# Patient Record
Sex: FEMALE | Race: WHITE | NOT HISPANIC OR LATINO | URBAN - METROPOLITAN AREA
[De-identification: names, ages, dates, MRNs, and addresses within clinical notes are randomized per-mention and may not be internally consistent; named-entity substitution may affect disease eponyms.]

---

## 2017-09-12 ENCOUNTER — OUTPATIENT (OUTPATIENT)
Dept: OUTPATIENT SERVICES | Facility: HOSPITAL | Age: 27
LOS: 1 days | Discharge: HOME | End: 2017-09-12

## 2017-09-12 DIAGNOSIS — R59.1 GENERALIZED ENLARGED LYMPH NODES: ICD-10-CM

## 2019-09-10 PROBLEM — Z00.00 ENCOUNTER FOR PREVENTIVE HEALTH EXAMINATION: Status: ACTIVE | Noted: 2019-09-10

## 2019-09-17 ENCOUNTER — LABORATORY RESULT (OUTPATIENT)
Age: 29
End: 2019-09-17

## 2019-09-18 ENCOUNTER — OUTPATIENT (OUTPATIENT)
Dept: OUTPATIENT SERVICES | Facility: HOSPITAL | Age: 29
LOS: 1 days | Discharge: HOME | End: 2019-09-18

## 2019-09-18 ENCOUNTER — APPOINTMENT (OUTPATIENT)
Dept: SURGERY | Facility: CLINIC | Age: 29
End: 2019-09-18
Payer: COMMERCIAL

## 2019-09-18 VITALS
BODY MASS INDEX: 23.03 KG/M2 | WEIGHT: 122 LBS | TEMPERATURE: 99.3 F | HEART RATE: 92 BPM | HEIGHT: 61 IN | DIASTOLIC BLOOD PRESSURE: 90 MMHG | SYSTOLIC BLOOD PRESSURE: 138 MMHG

## 2019-09-18 DIAGNOSIS — L05.91 PILONIDAL CYST WITHOUT ABSCESS: ICD-10-CM

## 2019-09-18 PROCEDURE — 99203 OFFICE O/P NEW LOW 30 MIN: CPT

## 2019-09-21 RX ORDER — AMOXICILLIN AND CLAVULANATE POTASSIUM 875; 125 MG/1; MG/1
875-125 TABLET, COATED ORAL
Qty: 14 | Refills: 0 | Status: ACTIVE | COMMUNITY
Start: 2019-09-21 | End: 1900-01-01

## 2019-09-21 NOTE — REASON FOR VISIT
[Initial Consultation] : an initial consultation for [FreeTextEntry2] : Pilonidal cyst versus inclusion cyst

## 2019-09-21 NOTE — PHYSICAL EXAM
[Normal] : supple, no neck mass and thyroid not enlarged [Normal Neck Lymph Nodes] : normal neck lymph nodes  [Normal Supraclavicular Lymph Nodes] : normal supraclavicular lymph nodes [Normal] : oriented to person, place and time, with appropriate affect [de-identified] : right, buttock, 4 x 3 cm area that appeared an infected inclusion cyst. Culture sent.

## 2019-09-21 NOTE — HISTORY OF PRESENT ILLNESS
[de-identified] : 28 yo female patient with no significant medical history, complains of an infected cyst in her right buttock. The cyst drained spontaneously. That happened in the past past and she wants a more definitive treatment for recurrent infection in her buttock. She denies  any other symptoms.

## 2019-09-21 NOTE — ASSESSMENT
[FreeTextEntry1] : 28 yo female patient with no significant medical history, complains of an infected cyst in her right buttock. The cyst drained spontaneously. That happened in the past past and she wants a more definitive treatment for recurrent infection in her buttock. She denies  any other symptoms. \par \par Assessment and Plan:\par \par Infected inclusion cyst, right buttock.\par Culture sent.\par Continue antibiotic previously prescribed.\par \par Excision of recurrent sebaceous cyst offered, right buttock. Risks, benefits, alternatives and long-term consequences of the procedure were fully discussed with the patient. Consent signed in my office today. Preoperative work-up ordered. \par \par All the questions were answered to their satisfaction and I encouraged the patient to call my office at anytime if they had any questions. Plan of care fully discussed with the patient.

## 2019-10-02 ENCOUNTER — APPOINTMENT (OUTPATIENT)
Dept: SURGERY | Facility: CLINIC | Age: 29
End: 2019-10-02
Payer: COMMERCIAL

## 2019-10-02 VITALS
HEIGHT: 61 IN | SYSTOLIC BLOOD PRESSURE: 122 MMHG | WEIGHT: 121 LBS | BODY MASS INDEX: 22.84 KG/M2 | DIASTOLIC BLOOD PRESSURE: 66 MMHG | TEMPERATURE: 99 F | HEART RATE: 84 BPM

## 2019-10-02 PROCEDURE — 99213 OFFICE O/P EST LOW 20 MIN: CPT

## 2019-10-06 NOTE — PHYSICAL EXAM
[Normal] : supple, no neck mass and thyroid not enlarged [Normal Neck Lymph Nodes] : normal neck lymph nodes  [Normal Supraclavicular Lymph Nodes] : normal supraclavicular lymph nodes [Normal] : oriented to person, place and time, with appropriate affect [de-identified] : right, buttock, 4 x 3 cm area that appeared an inclusion cyst. No cellulitis and wound closed.

## 2019-10-06 NOTE — HISTORY OF PRESENT ILLNESS
[de-identified] : 28 yo female patient with no significant medical history, complains of an infected cyst in her right buttock. The cyst drained spontaneously. That happened in the past past and she wants a more definitive treatment for recurrent infection in her buttock. She was treated with Augmentin and wound closed. She denies  any other symptoms. Today for followup.

## 2019-10-06 NOTE — ASSESSMENT
[FreeTextEntry1] : 30 yo female patient with no significant medical history, complains of an infected cyst in her right buttock. The cyst drained spontaneously. That happened in the past past and she wants a more definitive treatment for recurrent infection in her buttock. She was treated with Augmentin and wound closed. She denies  any other symptoms. Today for followup. \par \par Assessment and Plan:\par \par Infected inclusion cyst, right buttock. Infection treated, wound closed.\par Culture revealed enterococcus, sensitive to Ampicillin, started on Augmentin.\par \par Excision of recurrent sebaceous cyst offered, right buttock. Risks, benefits, alternatives and long-term consequences of the procedure were fully discussed with the patient. Consent signed in my office today. Preoperative work-up ordered. \par \par All the questions were answered to their satisfaction and I encouraged the patient to call my office at anytime if she had any questions. Plan of care fully discussed with the patient.

## 2019-10-24 ENCOUNTER — OUTPATIENT (OUTPATIENT)
Dept: OUTPATIENT SERVICES | Facility: HOSPITAL | Age: 29
LOS: 1 days | Discharge: HOME | End: 2019-10-24

## 2019-10-24 VITALS
OXYGEN SATURATION: 96 % | DIASTOLIC BLOOD PRESSURE: 68 MMHG | TEMPERATURE: 98 F | HEIGHT: 61 IN | WEIGHT: 123.02 LBS | HEART RATE: 68 BPM | RESPIRATION RATE: 18 BRPM | SYSTOLIC BLOOD PRESSURE: 121 MMHG

## 2019-10-24 DIAGNOSIS — L05.91 PILONIDAL CYST WITHOUT ABSCESS: ICD-10-CM

## 2019-10-24 DIAGNOSIS — Z98.890 OTHER SPECIFIED POSTPROCEDURAL STATES: Chronic | ICD-10-CM

## 2019-10-24 DIAGNOSIS — Z01.818 ENCOUNTER FOR OTHER PREPROCEDURAL EXAMINATION: ICD-10-CM

## 2019-10-24 LAB
ALBUMIN SERPL ELPH-MCNC: 4.4 G/DL — SIGNIFICANT CHANGE UP (ref 3.5–5.2)
ALP SERPL-CCNC: 70 U/L — SIGNIFICANT CHANGE UP (ref 30–115)
ALT FLD-CCNC: 15 U/L — SIGNIFICANT CHANGE UP (ref 0–41)
ANION GAP SERPL CALC-SCNC: 15 MMOL/L — HIGH (ref 7–14)
AST SERPL-CCNC: 19 U/L — SIGNIFICANT CHANGE UP (ref 0–41)
BASOPHILS # BLD AUTO: 0.07 K/UL — SIGNIFICANT CHANGE UP (ref 0–0.2)
BASOPHILS NFR BLD AUTO: 1 % — SIGNIFICANT CHANGE UP (ref 0–1)
BILIRUB SERPL-MCNC: 0.3 MG/DL — SIGNIFICANT CHANGE UP (ref 0.2–1.2)
BUN SERPL-MCNC: 9 MG/DL — LOW (ref 10–20)
CALCIUM SERPL-MCNC: 9.4 MG/DL — SIGNIFICANT CHANGE UP (ref 8.5–10.1)
CHLORIDE SERPL-SCNC: 100 MMOL/L — SIGNIFICANT CHANGE UP (ref 98–110)
CO2 SERPL-SCNC: 24 MMOL/L — SIGNIFICANT CHANGE UP (ref 17–32)
CREAT SERPL-MCNC: 0.6 MG/DL — LOW (ref 0.7–1.5)
EOSINOPHIL # BLD AUTO: 0.31 K/UL — SIGNIFICANT CHANGE UP (ref 0–0.7)
EOSINOPHIL NFR BLD AUTO: 4.6 % — SIGNIFICANT CHANGE UP (ref 0–8)
GLUCOSE SERPL-MCNC: 81 MG/DL — SIGNIFICANT CHANGE UP (ref 70–99)
HCT VFR BLD CALC: 44.2 % — SIGNIFICANT CHANGE UP (ref 37–47)
HGB BLD-MCNC: 14.3 G/DL — SIGNIFICANT CHANGE UP (ref 12–16)
IMM GRANULOCYTES NFR BLD AUTO: 0.3 % — SIGNIFICANT CHANGE UP (ref 0.1–0.3)
LYMPHOCYTES # BLD AUTO: 1.48 K/UL — SIGNIFICANT CHANGE UP (ref 1.2–3.4)
LYMPHOCYTES # BLD AUTO: 21.9 % — SIGNIFICANT CHANGE UP (ref 20.5–51.1)
MCHC RBC-ENTMCNC: 29.4 PG — SIGNIFICANT CHANGE UP (ref 27–31)
MCHC RBC-ENTMCNC: 32.4 G/DL — SIGNIFICANT CHANGE UP (ref 32–37)
MCV RBC AUTO: 90.9 FL — SIGNIFICANT CHANGE UP (ref 81–99)
MONOCYTES # BLD AUTO: 0.53 K/UL — SIGNIFICANT CHANGE UP (ref 0.1–0.6)
MONOCYTES NFR BLD AUTO: 7.8 % — SIGNIFICANT CHANGE UP (ref 1.7–9.3)
NEUTROPHILS # BLD AUTO: 4.36 K/UL — SIGNIFICANT CHANGE UP (ref 1.4–6.5)
NEUTROPHILS NFR BLD AUTO: 64.4 % — SIGNIFICANT CHANGE UP (ref 42.2–75.2)
NRBC # BLD: 0 /100 WBCS — SIGNIFICANT CHANGE UP (ref 0–0)
PLATELET # BLD AUTO: 306 K/UL — SIGNIFICANT CHANGE UP (ref 130–400)
POTASSIUM SERPL-MCNC: 4.7 MMOL/L — SIGNIFICANT CHANGE UP (ref 3.5–5)
POTASSIUM SERPL-SCNC: 4.7 MMOL/L — SIGNIFICANT CHANGE UP (ref 3.5–5)
PROT SERPL-MCNC: 8.1 G/DL — HIGH (ref 6–8)
RBC # BLD: 4.86 M/UL — SIGNIFICANT CHANGE UP (ref 4.2–5.4)
RBC # FLD: 12.3 % — SIGNIFICANT CHANGE UP (ref 11.5–14.5)
SODIUM SERPL-SCNC: 139 MMOL/L — SIGNIFICANT CHANGE UP (ref 135–146)
WBC # BLD: 6.77 K/UL — SIGNIFICANT CHANGE UP (ref 4.8–10.8)
WBC # FLD AUTO: 6.77 K/UL — SIGNIFICANT CHANGE UP (ref 4.8–10.8)

## 2019-10-24 NOTE — H&P PST ADULT - REASON FOR ADMISSION
28 yo f scheduled to PAST for excision of pilonidal cyst under GA on 10/31/2019 at [facility] by Dr. Amaya.

## 2019-10-24 NOTE — H&P PST ADULT - NSANTHOBSERVEDRD_ENT_A_CORE
gait, locomotion, and balance/aerobic capacity/endurance/muscle strength gait, locomotion, and balance/muscle strength/aerobic capacity/endurance No

## 2019-10-24 NOTE — H&P PST ADULT - NSANTHOSAYNRD_GEN_A_CORE
No. MENDOZA screening performed.  STOP BANG Legend: 0-2 = LOW Risk; 3-4 = INTERMEDIATE Risk; 5-8 = HIGH Risk

## 2019-10-24 NOTE — H&P PST ADULT - HISTORY OF PRESENT ILLNESS
Patient c/o recurrent pilonidal cyst with infection x 1 yr. Patient denies any c/o cp, sob, palpitations fever, cough or dysuria. Ex tolerance of 3 fos walk with out SOB. No diagnosis of MENDOZA.

## 2019-10-24 NOTE — H&P PST ADULT - NSICDXPASTMEDICALHX_GEN_ALL_CORE_FT
PAST MEDICAL HISTORY:  Asthma     GERD (gastroesophageal reflux disease) PAST MEDICAL HISTORY:  Asthma 2001 as a child    GERD (gastroesophageal reflux disease)     History of allergic rhinitis shakira

## 2019-10-24 NOTE — H&P PST ADULT - NSICDXPASTSURGICALHX_GEN_ALL_CORE_FT
PAST SURGICAL HISTORY:  History of incision and drainage under PAST SURGICAL HISTORY:  History of incision and drainage under  local

## 2019-10-25 PROBLEM — K21.9 GASTRO-ESOPHAGEAL REFLUX DISEASE WITHOUT ESOPHAGITIS: Chronic | Status: ACTIVE | Noted: 2019-10-24

## 2019-10-25 PROBLEM — J45.909 UNSPECIFIED ASTHMA, UNCOMPLICATED: Chronic | Status: ACTIVE | Noted: 2019-10-24

## 2019-10-25 PROBLEM — Z87.09 PERSONAL HISTORY OF OTHER DISEASES OF THE RESPIRATORY SYSTEM: Chronic | Status: ACTIVE | Noted: 2019-10-24

## 2019-10-29 ENCOUNTER — CHART COPY (OUTPATIENT)
Age: 29
End: 2019-10-29

## 2019-10-31 ENCOUNTER — RESULT REVIEW (OUTPATIENT)
Age: 29
End: 2019-10-31

## 2019-10-31 ENCOUNTER — APPOINTMENT (OUTPATIENT)
Dept: SURGERY | Facility: HOSPITAL | Age: 29
End: 2019-10-31

## 2019-10-31 ENCOUNTER — OUTPATIENT (OUTPATIENT)
Dept: OUTPATIENT SERVICES | Facility: HOSPITAL | Age: 29
LOS: 1 days | Discharge: HOME | End: 2019-10-31
Payer: COMMERCIAL

## 2019-10-31 VITALS
RESPIRATION RATE: 16 BRPM | DIASTOLIC BLOOD PRESSURE: 78 MMHG | SYSTOLIC BLOOD PRESSURE: 120 MMHG | OXYGEN SATURATION: 98 % | HEART RATE: 69 BPM

## 2019-10-31 VITALS
WEIGHT: 123.02 LBS | HEART RATE: 97 BPM | HEIGHT: 61 IN | DIASTOLIC BLOOD PRESSURE: 102 MMHG | RESPIRATION RATE: 18 BRPM | SYSTOLIC BLOOD PRESSURE: 122 MMHG | TEMPERATURE: 99 F

## 2019-10-31 DIAGNOSIS — Z98.890 OTHER SPECIFIED POSTPROCEDURAL STATES: Chronic | ICD-10-CM

## 2019-10-31 PROCEDURE — 11770 EXC PILONIDAL CYST SIMPLE: CPT

## 2019-10-31 PROCEDURE — 88304 TISSUE EXAM BY PATHOLOGIST: CPT | Mod: 26

## 2019-10-31 RX ORDER — IBUPROFEN 200 MG
1 TABLET ORAL
Qty: 9 | Refills: 0
Start: 2019-10-31 | End: 2019-11-02

## 2019-10-31 RX ORDER — HYDROMORPHONE HYDROCHLORIDE 2 MG/ML
1 INJECTION INTRAMUSCULAR; INTRAVENOUS; SUBCUTANEOUS
Refills: 0 | Status: DISCONTINUED | OUTPATIENT
Start: 2019-10-31 | End: 2019-11-01

## 2019-10-31 RX ORDER — SODIUM CHLORIDE 9 MG/ML
1000 INJECTION, SOLUTION INTRAVENOUS
Refills: 0 | Status: DISCONTINUED | OUTPATIENT
Start: 2019-10-31 | End: 2019-11-01

## 2019-10-31 RX ORDER — HYDROMORPHONE HYDROCHLORIDE 2 MG/ML
0.5 INJECTION INTRAMUSCULAR; INTRAVENOUS; SUBCUTANEOUS
Refills: 0 | Status: DISCONTINUED | OUTPATIENT
Start: 2019-10-31 | End: 2019-11-01

## 2019-10-31 RX ORDER — ONDANSETRON 8 MG/1
4 TABLET, FILM COATED ORAL ONCE
Refills: 0 | Status: DISCONTINUED | OUTPATIENT
Start: 2019-10-31 | End: 2019-11-01

## 2019-10-31 RX ORDER — ACETAMINOPHEN 500 MG
650 TABLET ORAL ONCE
Refills: 0 | Status: COMPLETED | OUTPATIENT
Start: 2019-10-31 | End: 2019-10-31

## 2019-10-31 RX ORDER — ACETAMINOPHEN 500 MG
1 TABLET ORAL
Qty: 20 | Refills: 0
Start: 2019-10-31 | End: 2019-11-04

## 2019-10-31 RX ADMIN — SODIUM CHLORIDE 100 MILLILITER(S): 9 INJECTION, SOLUTION INTRAVENOUS at 10:07

## 2019-10-31 RX ADMIN — Medication 650 MILLIGRAM(S): at 10:07

## 2019-10-31 RX ADMIN — Medication 650 MILLIGRAM(S): at 10:06

## 2019-10-31 NOTE — CHART NOTE - NSCHARTNOTEFT_GEN_A_CORE
PACU ANESTHESIA ADMISSION NOTE      Procedure: Excision, simple pilonidal cyst    Post op diagnosis:  Pilonidal cyst      ____  Intubated  TV:______       Rate: ______      FiO2: ______    __x__  Patent Airway    __x__  Full return of protective reflexes    __x__  Full recovery from anesthesia / back to baseline status    Vitals:  T(C): 37.1 (10-31-19 @ 08:37), Max: 37.1 (10-31-19 @ 07:48)  HR: 97 (10-31-19 @ 08:37) (97 - 97)  BP: 122/102 (10-31-19 @ 08:37) (122/102 - 122/102)  RR: 18 (10-31-19 @ 08:37) (18 - 18)  SpO2: --    Mental Status:  __x__ Awake   ___x__ Alert   _____ Drowsy   _____ Sedated    Nausea/Vomiting:  __x__ NO  ______Yes,   See Post - Op Orders          Pain Scale (0-10):  __0___    Treatment: ____ None    __x__ See Post - Op/PCA Orders    Post - Operative Fluids:   ____ Oral   __x__ See Post - Op Orders    Plan: Discharge:   __x__Home       _____Floor     _____Critical Care    _____  Other:_________________    Comments: Patient had smooth intraoperative event, no anesthesia complication.  PACU Vital signs: HR:  73          BP:   92     /    52      RR:   16          O2 Sat: 98      %     Temp 36

## 2019-10-31 NOTE — ASU PATIENT PROFILE, ADULT - PMH
Asthma  2001 as a child  GERD (gastroesophageal reflux disease)    History of allergic rhinitis  radhaonal

## 2019-10-31 NOTE — ASU DISCHARGE PLAN (ADULT/PEDIATRIC) - CARE PROVIDER_API CALL
Guillermo Amaya)  Surgery  [address] [place] [address], [address] [place] [address]  Phone: [phone]  Fax: [phone]  Follow Up Time: 1 week

## 2019-10-31 NOTE — ASU DISCHARGE PLAN (ADULT/PEDIATRIC) - CALL YOUR DOCTOR IF YOU HAVE ANY OF THE FOLLOWING:
Bleeding that does not stop/Swelling that gets worse/Fever greater than (need to indicate Fahrenheit or Celsius)/Wound/Surgical Site with redness, or foul smelling discharge or pus/Pain not relieved by Medications

## 2019-11-01 LAB — SURGICAL PATHOLOGY STUDY: SIGNIFICANT CHANGE UP

## 2019-11-05 DIAGNOSIS — L05.91 PILONIDAL CYST WITHOUT ABSCESS: ICD-10-CM

## 2019-11-05 DIAGNOSIS — K21.9 GASTRO-ESOPHAGEAL REFLUX DISEASE WITHOUT ESOPHAGITIS: ICD-10-CM

## 2019-11-05 DIAGNOSIS — J45.909 UNSPECIFIED ASTHMA, UNCOMPLICATED: ICD-10-CM

## 2019-11-10 ENCOUNTER — LABORATORY RESULT (OUTPATIENT)
Age: 29
End: 2019-11-10

## 2019-11-11 ENCOUNTER — APPOINTMENT (OUTPATIENT)
Dept: SURGERY | Facility: CLINIC | Age: 29
End: 2019-11-11
Payer: COMMERCIAL

## 2019-11-11 ENCOUNTER — OUTPATIENT (OUTPATIENT)
Dept: OUTPATIENT SERVICES | Facility: HOSPITAL | Age: 29
LOS: 1 days | Discharge: HOME | End: 2019-11-11

## 2019-11-11 VITALS
HEART RATE: 93 BPM | DIASTOLIC BLOOD PRESSURE: 66 MMHG | SYSTOLIC BLOOD PRESSURE: 118 MMHG | WEIGHT: 123 LBS | TEMPERATURE: 98.8 F | BODY MASS INDEX: 23.22 KG/M2 | HEIGHT: 61 IN

## 2019-11-11 DIAGNOSIS — Z98.890 OTHER SPECIFIED POSTPROCEDURAL STATES: Chronic | ICD-10-CM

## 2019-11-11 DIAGNOSIS — S31.809A UNSPECIFIED OPEN WOUND OF UNSPECIFIED BUTTOCK, INITIAL ENCOUNTER: ICD-10-CM

## 2019-11-11 PROCEDURE — 99024 POSTOP FOLLOW-UP VISIT: CPT

## 2019-11-15 RX ORDER — CIPROFLOXACIN HYDROCHLORIDE 500 MG/1
500 TABLET, FILM COATED ORAL
Qty: 14 | Refills: 0 | Status: ACTIVE | COMMUNITY
Start: 2019-11-15 | End: 1900-01-01

## 2019-11-16 NOTE — ASSESSMENT
[FreeTextEntry1] : 28 yo female patient with no significant medical history, complains of an infected cyst in her right buttock. The cyst drained spontaneously. That happened in the past past and she wants a more definitive treatment for recurrent infection in her buttock. She was treated with Augmentin and wound closed. \par \par She underwent excision of right buttock pilonidal cyst on October 31, 2019. Comes today c/o of serous drainage from the wound.\par \par Assessment and Plan:\par \par Infected inclusion cyst, right buttock. Infection treated, wound closed. Culture revealed enterococcus, sensitive to Ampicillin, started on Augmentin.\par \par s/p Excision of recurrent pilonidal cyst on October 31, 2019.\par Serous drainage from wound. stitches left in place and culture sent.\par Return in a week for reevaluation of the wound. will start antibiotics if culture is positive.\par \par All the questions were answered to their satisfaction and I encouraged the patient to call my office at anytime if she had any questions. Plan of care fully discussed with the patient.

## 2019-11-16 NOTE — PHYSICAL EXAM
[Normal] : supple, no neck mass and thyroid not enlarged [Normal Neck Lymph Nodes] : normal neck lymph nodes  [Normal Supraclavicular Lymph Nodes] : normal supraclavicular lymph nodes [Normal] : oriented to person, place and time, with appropriate affect [de-identified] : right, buttock, wound with serous drainage, culture sent, stitches left in placed and wound covered with sterile dressing.

## 2019-11-16 NOTE — HISTORY OF PRESENT ILLNESS
[de-identified] : 30 yo female patient with no significant medical history, complains of an infected cyst in her right buttock. The cyst drained spontaneously. That happened in the past past and she wants a more definitive treatment for recurrent infection in her buttock. She was treated with Augmentin and wound closed. \par \par She underwent excision of right buttock pilonidal cyst on October 31, 2019. Comes today c/o of serous drainage from the wound. denies any other complains.

## 2019-11-18 ENCOUNTER — APPOINTMENT (OUTPATIENT)
Dept: SURGERY | Facility: CLINIC | Age: 29
End: 2019-11-18
Payer: COMMERCIAL

## 2019-11-18 VITALS
HEART RATE: 62 BPM | HEIGHT: 61 IN | BODY MASS INDEX: 22.84 KG/M2 | SYSTOLIC BLOOD PRESSURE: 120 MMHG | TEMPERATURE: 98.8 F | DIASTOLIC BLOOD PRESSURE: 62 MMHG | WEIGHT: 121 LBS

## 2019-11-18 PROCEDURE — 99024 POSTOP FOLLOW-UP VISIT: CPT

## 2019-11-26 NOTE — ASSESSMENT
[FreeTextEntry1] : 28 yo female patient with no significant medical history, complains of an infected cyst in her right buttock. The cyst drained spontaneously. That happened in the past past and she wants a more definitive treatment for recurrent infection in her buttock. She was treated with Augmentin and wound closed. \par \par She underwent excision of right buttock pilonidal cyst on October 31, 2019. Comes today c/o of serous drainage from the wound. Culture revealed Citrobacter Koseri, sensitive to Cipro. Started on Cipro x 7 days.\par \par Assessment and Plan:\par \par Infected inclusion cyst, right buttock. Infection treated, wound closed. Culture revealed enterococcus, sensitive to Ampicillin, started on Augmentin.\par \par s/p Excision of recurrent pilonidal cyst on October 31, 2019.\par Serous drainage from wound. stitches left in place and culture sent. Culture positive for Citrobacter koseri.\par Started on Cipro x 7 days.\par Stitches removed today.\par Return in 1 week for reevaluation of the wound.\par \par All the questions were answered to their satisfaction and I encouraged the patient to call my office at anytime if she had any questions. Plan of care fully discussed with the patient.

## 2019-11-26 NOTE — HISTORY OF PRESENT ILLNESS
[de-identified] : 30 yo female patient with no significant medical history, complains of an infected cyst in her right buttock. The cyst drained spontaneously. That happened in the past past and she wants a more definitive treatment for recurrent infection in her buttock. She was treated with Augmentin and wound closed. \par \par She underwent excision of right buttock pilonidal cyst on October 31, 2019. Comes today c/o of serous drainage from the wound. Culture revealed Citrobacter Koseri, sensitive to Cipro. Started on Cipro x 7 days.

## 2019-11-26 NOTE — PHYSICAL EXAM
[Normal] : supple, no neck mass and thyroid not enlarged [Normal Neck Lymph Nodes] : normal neck lymph nodes  [Normal Supraclavicular Lymph Nodes] : normal supraclavicular lymph nodes [Normal] : oriented to person, place and time, with appropriate affect [de-identified] : right, buttock, wound with serous drainage, stitches removed and wound packed open. No cellulitis or purulent drainage.

## 2019-11-27 ENCOUNTER — APPOINTMENT (OUTPATIENT)
Dept: SURGERY | Facility: CLINIC | Age: 29
End: 2019-11-27
Payer: COMMERCIAL

## 2019-11-27 VITALS
SYSTOLIC BLOOD PRESSURE: 110 MMHG | DIASTOLIC BLOOD PRESSURE: 74 MMHG | TEMPERATURE: 98.6 F | HEART RATE: 76 BPM | BODY MASS INDEX: 23.03 KG/M2 | HEIGHT: 61 IN | WEIGHT: 122 LBS

## 2019-11-27 PROCEDURE — 99212 OFFICE O/P EST SF 10 MIN: CPT

## 2019-12-01 NOTE — HISTORY OF PRESENT ILLNESS
[de-identified] : 28 yo female patient with no significant medical history, complains of an infected cyst in her right buttock. The cyst drained spontaneously. That happened in the past past and she wants a more definitive treatment for recurrent infection in her buttock. She was treated with Augmentin and wound closed. \par \par She underwent excision of right buttock pilonidal cyst on October 31, 2019. Comes today c/o of serous drainage from the wound. Culture revealed Citrobacter Koseri, sensitive to Cipro. Started on Cipro x 7 days which was completed. Still draining serous fluid. No other symptoms.

## 2019-12-01 NOTE — PHYSICAL EXAM
[Normal] : supple, no neck mass and thyroid not enlarged [Normal Neck Lymph Nodes] : normal neck lymph nodes  [Normal Supraclavicular Lymph Nodes] : normal supraclavicular lymph nodes [Normal] : oriented to person, place and time, with appropriate affect [de-identified] : right, buttock, wound with serous drainage, piece of old packing found at the base of wound. wound packed open. No cellulitis or purulent drainage.

## 2019-12-01 NOTE — ASSESSMENT
[FreeTextEntry1] : 30 yo female patient with no significant medical history, complains of an infected cyst in her right buttock. The cyst drained spontaneously. That happened in the past past and she wants a more definitive treatment for recurrent infection in her buttock. She was treated with Augmentin and wound closed. \par \par She underwent excision of right buttock pilonidal cyst on October 31, 2019. Comes today c/o of serous drainage from the wound. Culture revealed Citrobacter Koseri, sensitive to Cipro. Started on Cipro x 7 days which was completed. Still draining serous fluid. No other symptoms.\par \par Assessment and Plan:\par \par Infected inclusion cyst, right buttock. Infection treated, wound closed. Culture revealed enterococcus, sensitive to Ampicillin, started on Augmentin before surgery.\par \par s/p Excision of recurrent pilonidal cyst on October 31, 2019.\par Serous drainage from wound. stitches left in place and culture sent. Culture positive for Citrobacter koseri.\par Started on Cipro x 7 days and completed.\par \par Return in 1 week for reevaluation of the wound.\par \par All the questions were answered to their satisfaction and I encouraged the patient to call my office at anytime if she had any questions. Plan of care fully discussed with the patient.

## 2019-12-04 ENCOUNTER — APPOINTMENT (OUTPATIENT)
Dept: SURGERY | Facility: CLINIC | Age: 29
End: 2019-12-04
Payer: COMMERCIAL

## 2019-12-04 VITALS
SYSTOLIC BLOOD PRESSURE: 124 MMHG | BODY MASS INDEX: 23.03 KG/M2 | WEIGHT: 122 LBS | DIASTOLIC BLOOD PRESSURE: 80 MMHG | HEART RATE: 74 BPM | HEIGHT: 61 IN | TEMPERATURE: 97.2 F

## 2019-12-04 PROCEDURE — 99212 OFFICE O/P EST SF 10 MIN: CPT

## 2019-12-04 NOTE — PHYSICAL EXAM
[Normal] : supple, no neck mass and thyroid not enlarged [Normal Neck Lymph Nodes] : normal neck lymph nodes  [Normal Supraclavicular Lymph Nodes] : normal supraclavicular lymph nodes [Normal] : oriented to person, place and time, with appropriate affect [de-identified] : right, buttock, wound with serous drainage, piece of old packing found at the base of wound. wound packed open. No cellulitis or purulent drainage.

## 2019-12-04 NOTE — HISTORY OF PRESENT ILLNESS
[de-identified] : 28 yo female patient with no significant medical history, complains of an infected cyst in her right buttock. The cyst drained spontaneously. That happened in the past past and she wants a more definitive treatment for recurrent infection in her buttock. She was treated with Augmentin and wound closed. \par \par She underwent excision of right buttock pilonidal cyst on October 31, 2019. Comes today c/o of serous drainage from the wound. Culture revealed Citrobacter Koseri, sensitive to Cipro. Started on Cipro x 7 days which was completed. Still draining serous fluid, less drainage and wound in jesu.

## 2019-12-04 NOTE — ASSESSMENT
[FreeTextEntry1] : 30 yo female patient with no significant medical history, complains of an infected cyst in her right buttock. The cyst drained spontaneously. That happened in the past past and she wants a more definitive treatment for recurrent infection in her buttock. She was treated with Augmentin and wound closed. \par \par She underwent excision of right buttock pilonidal cyst on October 31, 2019. Comes today c/o of serous drainage from the wound. Culture revealed Citrobacter Koseri, sensitive to Cipro. Started on Cipro x 7 days which was completed. Still draining serous fluid, less drainage and wound in jesu.\par \par Assessment and Plan:\par \par Infected inclusion cyst, right buttock. Infection treated, wound closed. Culture revealed enterococcus, sensitive to Ampicillin, started on Augmentin before surgery.\par \par s/p Excision of recurrent pilonidal cyst on October 31, 2019.\par Serous drainage from wound. stitches left in place and culture sent. Culture positive for Citrobacter koseri.\par Started on Cipro x 7 days and completed.\par Wound is healing nicely, packed open today.\par \par Return in 2 weeks if wound still open or as needed.\par \par All the questions were answered to their satisfaction and I encouraged the patient to call my office at anytime if she had any questions. Plan of care fully discussed with the patient.

## 2019-12-16 ENCOUNTER — APPOINTMENT (OUTPATIENT)
Dept: SURGERY | Facility: CLINIC | Age: 29
End: 2019-12-16
Payer: COMMERCIAL

## 2019-12-16 VITALS
SYSTOLIC BLOOD PRESSURE: 116 MMHG | HEIGHT: 61 IN | WEIGHT: 122 LBS | BODY MASS INDEX: 23.03 KG/M2 | DIASTOLIC BLOOD PRESSURE: 70 MMHG | HEART RATE: 73 BPM | TEMPERATURE: 99.2 F

## 2019-12-16 PROCEDURE — 99212 OFFICE O/P EST SF 10 MIN: CPT

## 2019-12-22 NOTE — ASSESSMENT
[FreeTextEntry1] : 30 yo female patient with no significant medical history, complains of an infected cyst in her right buttock. The cyst drained spontaneously. That happened in the past past and she wants a more definitive treatment for recurrent infection in her buttock. She was treated with Augmentin and wound closed. \par \par She underwent excision of right buttock pilonidal cyst on October 31, 2019. Comes today c/o of serous drainage from the wound. Culture revealed Citrobacter Koseri, sensitive to Cipro. Started on Cipro x 7 days which was completed. Still draining serous fluid, less drainage and wound in jesu. Today for followup.\par \par Assessment and Plan:\par \par Infected inclusion cyst, right buttock. Infection treated, wound closed. Culture revealed enterococcus, sensitive to Ampicillin, started on Augmentin before surgery.\par \par s/p Excision of recurrent pilonidal cyst on October 31, 2019.\par Serous drainage from wound, contraction, not packed, silver nitrate applied.\par \par Return in 2 weeks if wound still open or as needed.\par \par All the questions were answered to their satisfaction and I encouraged the patient to call my office at anytime if she had any questions. Plan of care fully discussed with the patient.

## 2019-12-22 NOTE — PHYSICAL EXAM
[Normal] : supple, no neck mass and thyroid not enlarged [Normal Neck Lymph Nodes] : normal neck lymph nodes  [Normal Supraclavicular Lymph Nodes] : normal supraclavicular lymph nodes [Normal] : oriented to person, place and time, with appropriate affect [de-identified] : right, buttock, wound with serous drainage, contraction, silver nitrate applied to granulation tissue. Unable to pack.

## 2019-12-22 NOTE — HISTORY OF PRESENT ILLNESS
[de-identified] : 30 yo female patient with no significant medical history, complains of an infected cyst in her right buttock. The cyst drained spontaneously. That happened in the past past and she wants a more definitive treatment for recurrent infection in her buttock. She was treated with Augmentin and wound closed. \par \par She underwent excision of right buttock pilonidal cyst on October 31, 2019. Comes today c/o of serous drainage from the wound. Culture revealed Citrobacter Koseri, sensitive to Cipro. Started on Cipro x 7 days which was completed. Still draining serous fluid, less drainage and wound in jesu. Today for followup.

## 2019-12-30 ENCOUNTER — APPOINTMENT (OUTPATIENT)
Dept: SURGERY | Facility: CLINIC | Age: 29
End: 2019-12-30
Payer: COMMERCIAL

## 2019-12-30 VITALS
DIASTOLIC BLOOD PRESSURE: 78 MMHG | BODY MASS INDEX: 22.84 KG/M2 | HEART RATE: 73 BPM | WEIGHT: 121 LBS | TEMPERATURE: 98.8 F | SYSTOLIC BLOOD PRESSURE: 118 MMHG | HEIGHT: 61 IN

## 2019-12-30 PROCEDURE — 99213 OFFICE O/P EST LOW 20 MIN: CPT

## 2019-12-31 NOTE — ASSESSMENT
[FreeTextEntry1] : 30 yo female patient with no significant medical history, complains of an infected cyst in her right buttock. The cyst drained spontaneously. That happened in the past past and she wants a more definitive treatment for recurrent infection in her buttock. She was treated with Augmentin and wound closed. \par \par She underwent excision of right buttock pilonidal cyst on October 31, 2019. Comes today c/o of serous drainage from the wound. Culture revealed Citrobacter Koseri, sensitive to Cipro. Started on Cipro x 7 days which was completed. Still draining serous fluid, small amount, less drainage and wound in jesu. Today for followup.\par \par Assessment and Plan:\par \par Infected pilonidal cyst, right buttock. Infection treated, wound closed. Culture revealed enterococcus, sensitive to Ampicillin, started on Augmentin before surgery.\par \par s/p Excision of recurrent pilonidal cyst on October 31, 2019.\par Serous drainage from wound minimal, contraction, not packed, silver nitrate applied.\par \par Return in 2 weeks if wound still open or as needed.\par \par All the questions were answered to their satisfaction and I encouraged the patient to call my office at anytime if she had any questions. Plan of care fully discussed with the patient.

## 2019-12-31 NOTE — HISTORY OF PRESENT ILLNESS
[de-identified] : 30 yo female patient with no significant medical history, complains of an infected cyst in her right buttock. The cyst drained spontaneously. That happened in the past past and she wants a more definitive treatment for recurrent infection in her buttock. She was treated with Augmentin and wound closed. \par \par She underwent excision of right buttock pilonidal cyst on October 31, 2019. Comes today c/o of serous drainage from the wound. Culture revealed Citrobacter Koseri, sensitive to Cipro. Started on Cipro x 7 days which was completed. Still draining serous fluid, small amount, less drainage and wound in jesu. Today for followup.

## 2019-12-31 NOTE — PHYSICAL EXAM
[Normal] : supple, no neck mass and thyroid not enlarged [Normal Supraclavicular Lymph Nodes] : normal supraclavicular lymph nodes [Normal Neck Lymph Nodes] : normal neck lymph nodes  [Normal] : oriented to person, place and time, with appropriate affect [de-identified] : right, buttock, wound with n0 drainage, contraction, silver nitrate applied to granulation tissue. Unable to pack. Dressing applied.

## 2020-01-26 ENCOUNTER — LABORATORY RESULT (OUTPATIENT)
Age: 30
End: 2020-01-26

## 2020-01-27 ENCOUNTER — APPOINTMENT (OUTPATIENT)
Dept: SURGERY | Facility: CLINIC | Age: 30
End: 2020-01-27
Payer: COMMERCIAL

## 2020-01-27 VITALS
HEART RATE: 90 BPM | SYSTOLIC BLOOD PRESSURE: 130 MMHG | WEIGHT: 121 LBS | HEIGHT: 61 IN | BODY MASS INDEX: 22.84 KG/M2 | TEMPERATURE: 99 F | DIASTOLIC BLOOD PRESSURE: 89 MMHG

## 2020-01-27 PROCEDURE — 99212 OFFICE O/P EST SF 10 MIN: CPT

## 2020-01-28 NOTE — HISTORY OF PRESENT ILLNESS
[de-identified] : 29 yo female patient with no significant medical history, complains of an infected cyst in her right buttock. The cyst drained spontaneously. That happened in the past past and she wants a more definitive treatment for recurrent infection in her buttock. She was treated with Augmentin and wound closed. \par \par She underwent excision of right buttock pilonidal cyst on October 31, 2019. Comes today c/o of serous drainage from the wound. Culture revealed Citrobacter Koseri, sensitive to Cipro. Completed  Cipro x 7 days. wound continue to close and recently developed more drainage. Still draining serous fluid, small amount, less drainage and wound in jesu. Today for followup.

## 2020-01-28 NOTE — PHYSICAL EXAM
[Normal] : supple, no neck mass and thyroid not enlarged [Normal Neck Lymph Nodes] : normal neck lymph nodes  [Normal Supraclavicular Lymph Nodes] : normal supraclavicular lymph nodes [Normal] : oriented to person, place and time, with appropriate affect [de-identified] : right, buttock, wound with minimal drainage, contraction, silver nitrate applied to granulation tissue. Unable to pack. Dressing applied. Silver nitrate applied. No cellulitis.

## 2020-01-28 NOTE — ASSESSMENT
[FreeTextEntry1] : 29 yo female patient with no significant medical history, complains of an infected cyst in her right buttock. The cyst drained spontaneously. That happened in the past past and she wants a more definitive treatment for recurrent infection in her buttock. She was treated with Augmentin and wound closed. \par \par She underwent excision of right buttock pilonidal cyst on October 31, 2019. Comes today c/o of serous drainage from the wound. Culture revealed Citrobacter Koseri, sensitive to Cipro. Completed  Cipro x 7 days. wound continue to close and recently developed more drainage. Still draining serous fluid, small amount, less drainage and wound in jesu. Today for followup.\par \par Assessment and Plan:\par \par Infected pilonidal cyst, right buttock. Infection treated, wound closed. Culture revealed enterococcus, sensitive to Ampicillin, started on Augmentin before surgery.\par \par s/p Excision of recurrent pilonidal cyst on October 31, 2019.\par Serous drainage from wound minimal, contraction, not packed, silver nitrate applied. Culture sent.\par \par Return in a week to reassess wound.\par \par All the questions were answered to their satisfaction and I encouraged the patient to call my office at anytime if she had any questions. Plan of care fully discussed with the patient.

## 2020-02-03 ENCOUNTER — APPOINTMENT (OUTPATIENT)
Dept: SURGERY | Facility: CLINIC | Age: 30
End: 2020-02-03
Payer: COMMERCIAL

## 2020-02-03 VITALS
DIASTOLIC BLOOD PRESSURE: 72 MMHG | BODY MASS INDEX: 22.66 KG/M2 | HEART RATE: 78 BPM | WEIGHT: 120 LBS | HEIGHT: 61 IN | TEMPERATURE: 98.7 F | SYSTOLIC BLOOD PRESSURE: 108 MMHG

## 2020-02-03 PROCEDURE — 99213 OFFICE O/P EST LOW 20 MIN: CPT

## 2020-02-03 NOTE — HISTORY OF PRESENT ILLNESS
[de-identified] : 31 yo female patient with no significant medical history, complains of an infected cyst in her right buttock. The cyst drained spontaneously. That happened in the past past and she wants a more definitive treatment for recurrent infection in her buttock. She was treated with Augmentin and wound closed. \par \par She underwent excision of right buttock pilonidal cyst on October 31, 2019. Comes today c/o of serous drainage from the wound. Culture revealed Citrobacter Koseri, sensitive to Cipro. Completed  Cipro x 7 days. wound continue to close and recently developed more drainage. Still draining serous fluid, minimal amount, less drainage and wound in jesu. Culture grew enterococcus sensitive to ampicillin. Today for followup.

## 2020-02-03 NOTE — PHYSICAL EXAM
[Normal] : supple, no neck mass and thyroid not enlarged [Normal Neck Lymph Nodes] : normal neck lymph nodes  [Normal Supraclavicular Lymph Nodes] : normal supraclavicular lymph nodes [Normal] : full range of motion and no deformities appreciated [de-identified] : right, buttock, wound with minimal drainage, contraction, silver nitrate applied to granulation tissue. Unable to pack. Dressing applied. No cellulitis.

## 2020-02-03 NOTE — ASSESSMENT
[FreeTextEntry1] : 31 yo female patient with no significant medical history, complains of an infected cyst in her right buttock. The cyst drained spontaneously. That happened in the past past and she wants a more definitive treatment for recurrent infection in her buttock. She was treated with Augmentin and wound closed. \par \par She underwent excision of right buttock pilonidal cyst on October 31, 2019. Comes today c/o of serous drainage from the wound. Culture revealed Citrobacter Koseri, sensitive to Cipro. Completed  Cipro x 7 days. wound continue to close and recently developed more drainage. Still draining serous fluid, minimal amount, less drainage and wound in jesu. Culture grew enterococcus sensitive to ampicillin. Today for followup.\par \par Assessment and Plan:\par \par Infected pilonidal cyst, right buttock. Infection treated, wound closed. Culture revealed enterococcus, sensitive to Ampicillin, started on Augmentin before surgery.\par \par s/p Excision of recurrent pilonidal cyst on October 31, 2019.\par Serous drainage from wound minimal, contraction, not packed, silver nitrate applied. Culture grew enterococcus sensitive to ampicillin.\par \par Return in 2-3 weeks to reassess.\par \par All the questions were answered to their satisfaction and I encouraged the patient to call my office at anytime if she had any questions. Plan of care fully discussed with the patient.

## 2020-02-24 ENCOUNTER — APPOINTMENT (OUTPATIENT)
Dept: SURGERY | Facility: CLINIC | Age: 30
End: 2020-02-24
Payer: COMMERCIAL

## 2020-02-24 VITALS
HEART RATE: 65 BPM | TEMPERATURE: 98.7 F | DIASTOLIC BLOOD PRESSURE: 78 MMHG | HEIGHT: 61 IN | SYSTOLIC BLOOD PRESSURE: 120 MMHG | WEIGHT: 120 LBS | BODY MASS INDEX: 22.66 KG/M2

## 2020-02-24 PROCEDURE — 99213 OFFICE O/P EST LOW 20 MIN: CPT

## 2020-02-24 NOTE — HISTORY OF PRESENT ILLNESS
[de-identified] : 31 yo female patient with no significant medical history, complains of an infected cyst in her right buttock. The cyst drained spontaneously. That happened in the past past and she wants a more definitive treatment for recurrent infection in her buttock. She was treated with Augmentin and wound closed.   She underwent excision of right buttock pilonidal cyst on October 31, 2019. Comes today c/o of serous drainage from the wound. Culture revealed Citrobacter Koseri, sensitive to Cipro. Completed  Cipro x 7 days. wound continue to close and recently developed more drainage. Still draining serous fluid, minimal amount, less drainage and wound in jesu. Culture grew enterococcus sensitive to ampicillin. Finished course of antibiotics.  She comes today for followup, less drainage, minimal, no cellulitis and minor discomfort. Today for followup.

## 2020-02-24 NOTE — ASSESSMENT
[FreeTextEntry1] : 29 yo female patient with no significant medical history, complains of an infected cyst in her right buttock. The cyst drained spontaneously. That happened in the past past and she wants a more definitive treatment for recurrent infection in her buttock. She was treated with Augmentin and wound closed. \par \par She underwent excision of right buttock pilonidal cyst on October 31, 2019. Comes today c/o of serous drainage from the wound. Culture revealed Citrobacter Koseri, sensitive to Cipro. Completed  Cipro x 7 days. wound continue to close and recently developed more drainage. Still draining serous fluid, minimal amount, less drainage and wound in jesu. Culture grew enterococcus sensitive to ampicillin. Finished course of antibiotics.\par \par She comes today for followup, less drainage, minimal, no cellulitis and minor discomfort. Today for followup.\par \par Assessment and Plan:\par \par Infected pilonidal cyst, right buttock. Infection treated, wound closed. Culture revealed enterococcus, sensitive to Ampicillin, treated with Augmentin.\par \par s/p Excision of recurrent pilonidal cyst on October 31, 2019.\par Minimal serous drainage from wound,, contraction, not packed, silver nitrate applied. \par \par Return in 2-3 weeks to reassess if she continues to drain.\par \par All the questions were answered to their satisfaction and I encouraged the patient to call my office at anytime if she had any questions. Plan of care fully discussed with the patient.

## 2020-02-24 NOTE — PHYSICAL EXAM
[Normal] : supple, no neck mass and thyroid not enlarged [Normal Neck Lymph Nodes] : normal neck lymph nodes  [Normal Supraclavicular Lymph Nodes] : normal supraclavicular lymph nodes [Normal] : oriented to person, place and time, with appropriate affect [de-identified] : right, buttock, wound with minimal drainage, contraction, silver nitrate applied to 1 x 1 mm wound. Unable to pack. Dressing applied. No cellulitis.

## 2020-04-26 ENCOUNTER — MESSAGE (OUTPATIENT)
Age: 30
End: 2020-04-26

## 2020-06-15 ENCOUNTER — APPOINTMENT (OUTPATIENT)
Dept: SURGERY | Facility: CLINIC | Age: 30
End: 2020-06-15
Payer: COMMERCIAL

## 2020-06-15 VITALS
DIASTOLIC BLOOD PRESSURE: 72 MMHG | BODY MASS INDEX: 23.41 KG/M2 | SYSTOLIC BLOOD PRESSURE: 116 MMHG | TEMPERATURE: 98 F | HEART RATE: 104 BPM | HEIGHT: 61 IN | WEIGHT: 124 LBS

## 2020-06-15 PROCEDURE — 99213 OFFICE O/P EST LOW 20 MIN: CPT

## 2020-06-16 NOTE — HISTORY OF PRESENT ILLNESS
[de-identified] : 31 yo female patient with no significant medical history, complains of an infected cyst in her right buttock. The cyst drained spontaneously. That happened in the past past and she wants a more definitive treatment for recurrent infection in her buttock. She was treated with Augmentin and wound closed. \par \par She underwent excision of right buttock pilonidal cyst on October 31, 2019. Comes today c/o of serous drainage from the wound. Culture revealed Citrobacter Koseri, sensitive to Cipro. Completed  Cipro x 7 days. wound continue to close and recently developed more drainage. Still draining serous fluid, minimal amount, less drainage and wound in jesu. Culture grew enterococcus sensitive to ampicillin. Finished course of antibiotics.\par \par Recurrent pilonidal cyst drained spontaneously in the recent past, dry and no drainage today. No pain. No other symptoms. She comes today for followup

## 2020-06-16 NOTE — PHYSICAL EXAM
[Normal] : supple, no neck mass and thyroid not enlarged [Normal Neck Lymph Nodes] : normal neck lymph nodes  [Normal Supraclavicular Lymph Nodes] : normal supraclavicular lymph nodes [Normal] : oriented to person, place and time, with appropriate affect [de-identified] : right, buttock, wound with no drainage, scar well-healed. Are of conser for the paitient with some fluctuance, needle aspiration, no fluid. dressing applied.

## 2020-06-16 NOTE — ASSESSMENT
[FreeTextEntry1] : 31 yo female patient with no significant medical history, complains of an infected cyst in her right buttock. The cyst drained spontaneously. That happened in the past past and she wants a more definitive treatment for recurrent infection in her buttock. She was treated with Augmentin and wound closed. \par \par She underwent excision of right buttock pilonidal cyst on October 31, 2019. Comes today c/o of serous drainage from the wound. Culture revealed Citrobacter Koseri, sensitive to Cipro. Completed  Cipro x 7 days. wound continue to close and recently developed more drainage. Still draining serous fluid, minimal amount, less drainage and wound in jesu. Culture grew enterococcus sensitive to ampicillin. Finished course of antibiotics.\par \par Drained spontaneously in the recent past, dry and no drainage today. No pain. No other symptoms. She comes today for followup\par \par Assessment and Plan:\par \par Infected pilonidal cyst, right buttock. Infection treated, wound closed. Culture revealed enterococcus, sensitive to Ampicillin, treated with Augmentin.\par \par s/p Excision of recurrent pilonidal cyst on October 31, 2019.\par No drainage and no abscess at this point.\par Return as needed if she continues to drain. She may need reexcision of the pilonidal cyst if it continues to be symptomatic.\par \par All the questions were answered to their satisfaction and I encouraged the patient to call my office at anytime if she had any questions. Plan of care fully discussed with the patient.

## 2020-07-24 ENCOUNTER — APPOINTMENT (OUTPATIENT)
Dept: SURGERY | Facility: CLINIC | Age: 30
End: 2020-07-24
Payer: COMMERCIAL

## 2020-07-24 VITALS
SYSTOLIC BLOOD PRESSURE: 118 MMHG | DIASTOLIC BLOOD PRESSURE: 64 MMHG | BODY MASS INDEX: 23.6 KG/M2 | HEIGHT: 61 IN | TEMPERATURE: 98.1 F | WEIGHT: 125 LBS | HEART RATE: 84 BPM

## 2020-07-24 PROCEDURE — 99203 OFFICE O/P NEW LOW 30 MIN: CPT

## 2020-07-24 RX ORDER — AMOXICILLIN AND CLAVULANATE POTASSIUM 875; 125 MG/1; MG/1
875-125 TABLET, COATED ORAL
Qty: 20 | Refills: 0 | Status: ACTIVE | COMMUNITY
Start: 2020-02-01 | End: 1900-01-01

## 2020-07-24 NOTE — PHYSICAL EXAM
[Abdomen Masses] : No abdominal masses [No HSM] : no hepatosplenomegaly [Abdomen Tenderness] : ~T No ~M abdominal tenderness [None] : no anal fissures seen [Excoriation] : no perianal excoriation [Fistula] : no fistulas [Pilonidal Cyst] : a pilonidal cyst [Ulcer ___ cm] : no ulcers [Wart] : no warts [Tender, Swollen] : nontender, non-swollen [Thrombosed] : that was not thrombosed [Skin Tags] : there were no residual hemorrhoidal skin tags seen [Respiratory Effort] : normal respiratory effort [Normal Rate and Rhythm] : normal rate and rhythm [de-identified] : awake, alert and in no acute distress [de-identified] : external examination shows a 4cm keloid scar on the right at the top of the  cleft.  There is a central defect measuring about 1cm with seropurulent drainage.  There is 1cm of surrounding erythema.  A small amount of fat necrosis can be seen within the wound.  The wound and cavity were probed with a cotton tipped applicator.  There was no obvious undrained abscess.

## 2020-07-24 NOTE — HISTORY OF PRESENT ILLNESS
[FreeTextEntry1] : Patient is a 30F with no significant PMH who presents for evaluation of recurrent pilonidal abscess.  She has had this for almost 1 year and has had it drained once.  Patient was previously cared for by Dr. Amaya. Originally she was though to have a sebaceous cyst and was taken for excision on 10/31/2019.  Pathology showed marked inflammatory reaction associated with sinus tract favoring pilonidal cyst.  She healed well but then had recurrence and spontaneous drainage.  This was treated with antibiotics.  She presents today due to recurrence.  She otherwise feels well.  She is tolerating a diet and denies fevers, chills, nausea, vomiting, abdominal pain, constipation, diarrhea, blood in his stool or unexpected weight loss.  Patient denies a family history of colon cancer rectal cancer or inflammatory bowel disease.  Patient has never had a colonoscopy.\par

## 2020-07-24 NOTE — ASSESSMENT
[FreeTextEntry1] : 30F with recurrent pilonidal abscess\par \par I had a long discussion with the patient regarding her pathology.  I recommended another course of antibiotics to manage the local tissue infection.  She will keep the area clean and dry.  We will have her return in 1 month.  If the wound is well healed at that time we will discuss excision with cleft lift procedure.

## 2020-08-24 ENCOUNTER — APPOINTMENT (OUTPATIENT)
Dept: SURGERY | Facility: CLINIC | Age: 30
End: 2020-08-24
Payer: COMMERCIAL

## 2020-08-24 VITALS
TEMPERATURE: 97.8 F | WEIGHT: 126 LBS | BODY MASS INDEX: 23.79 KG/M2 | HEIGHT: 61 IN | HEART RATE: 72 BPM | DIASTOLIC BLOOD PRESSURE: 72 MMHG | SYSTOLIC BLOOD PRESSURE: 120 MMHG

## 2020-08-24 PROCEDURE — 99214 OFFICE O/P EST MOD 30 MIN: CPT

## 2020-08-25 NOTE — HISTORY OF PRESENT ILLNESS
[FreeTextEntry1] : Patient is a 30F with no significant PMH who presents for follow up of recurrent pilonidal abscess.  She was previously cared for by Dr. Amaya. Originally she was though to have a sebaceous cyst and was taken for excision on 10/31/2019.  Pathology showed marked inflammatory reaction associated with sinus tract favoring pilonidal cyst.  She has since had 2 recurrence.  The most recent was treated on her last visit with Augmentin given that it had spontaneously drained.  She presents today for follow up.  She states that the pain and drainage have resolved. She is tolerating a diet and denies fevers, chills, nausea, vomiting, abdominal pain, constipation, diarrhea, blood in his stool or unexpected weight loss.  Patient denies a family history of colon cancer rectal cancer or inflammatory bowel disease.  Patient has never had a colonoscopy.\par

## 2020-08-25 NOTE — ASSESSMENT
[FreeTextEntry1] : 30F with recurrent pilonidal abscess\par \par I discussed the pathology of pilonidal cyst with the patient.  I recommended examination under anesthesia with pilonidal cyst excision and cleft lift procedure.  All risks benefits and alternatives were discussed including bleeding, infection, damage to surrounding structures, wound breakdown and recurrence.  The patient expressed understanding.  She would like to hold off at this time and contact us when she would like to book surgery.\par

## 2020-08-25 NOTE — PHYSICAL EXAM
[Abdomen Masses] : No abdominal masses [Abdomen Tenderness] : ~T No ~M abdominal tenderness [No HSM] : no hepatosplenomegaly [None] : no anal fissures seen [Excoriation] : no perianal excoriation [Fistula] : no fistulas [Wart] : no warts [Ulcer ___ cm] : no ulcers [Pilonidal Cyst] : a pilonidal cyst [Skin Tags] : there were no residual hemorrhoidal skin tags seen [Tender, Swollen] : nontender, non-swollen [Thrombosed] : that was not thrombosed [Respiratory Effort] : normal respiratory effort [Normal Rate and Rhythm] : normal rate and rhythm [de-identified] : external examination shows a 4cm keloid scar on the right at the top of the  cleft.  The previous defect and erythema have resolved.  No erythema induration or purulence noticed.  There is a small pit in the posterior midline immediately caudal to the previous incision. [de-identified] : awake, alert and in no acute distress

## 2020-09-28 ENCOUNTER — APPOINTMENT (OUTPATIENT)
Dept: SURGERY | Facility: CLINIC | Age: 30
End: 2020-09-28
Payer: COMMERCIAL

## 2020-09-28 VITALS
HEART RATE: 78 BPM | BODY MASS INDEX: 22.66 KG/M2 | WEIGHT: 120 LBS | SYSTOLIC BLOOD PRESSURE: 122 MMHG | DIASTOLIC BLOOD PRESSURE: 68 MMHG | TEMPERATURE: 97.8 F | HEIGHT: 61 IN

## 2020-09-28 PROCEDURE — 99213 OFFICE O/P EST LOW 20 MIN: CPT

## 2020-09-29 ENCOUNTER — OUTPATIENT (OUTPATIENT)
Dept: OUTPATIENT SERVICES | Facility: HOSPITAL | Age: 30
LOS: 1 days | Discharge: HOME | End: 2020-09-29

## 2020-09-29 VITALS
HEIGHT: 61 IN | HEART RATE: 79 BPM | WEIGHT: 121.25 LBS | OXYGEN SATURATION: 98 % | DIASTOLIC BLOOD PRESSURE: 74 MMHG | SYSTOLIC BLOOD PRESSURE: 123 MMHG | RESPIRATION RATE: 16 BRPM | TEMPERATURE: 98 F

## 2020-09-29 DIAGNOSIS — Z01.818 ENCOUNTER FOR OTHER PREPROCEDURAL EXAMINATION: ICD-10-CM

## 2020-09-29 DIAGNOSIS — L05.91 PILONIDAL CYST WITHOUT ABSCESS: ICD-10-CM

## 2020-09-29 DIAGNOSIS — Z98.890 OTHER SPECIFIED POSTPROCEDURAL STATES: Chronic | ICD-10-CM

## 2020-09-29 LAB
ALBUMIN SERPL ELPH-MCNC: 4.3 G/DL — SIGNIFICANT CHANGE UP (ref 3.5–5.2)
ALP SERPL-CCNC: 60 U/L — SIGNIFICANT CHANGE UP (ref 30–115)
ALT FLD-CCNC: 17 U/L — SIGNIFICANT CHANGE UP (ref 0–41)
ANION GAP SERPL CALC-SCNC: 8 MMOL/L — SIGNIFICANT CHANGE UP (ref 7–14)
APTT BLD: 30.4 SEC — SIGNIFICANT CHANGE UP (ref 27–39.2)
AST SERPL-CCNC: 20 U/L — SIGNIFICANT CHANGE UP (ref 0–41)
BASOPHILS # BLD AUTO: 0.05 K/UL — SIGNIFICANT CHANGE UP (ref 0–0.2)
BASOPHILS NFR BLD AUTO: 0.6 % — SIGNIFICANT CHANGE UP (ref 0–1)
BILIRUB SERPL-MCNC: 0.3 MG/DL — SIGNIFICANT CHANGE UP (ref 0.2–1.2)
BUN SERPL-MCNC: 13 MG/DL — SIGNIFICANT CHANGE UP (ref 10–20)
CALCIUM SERPL-MCNC: 8.9 MG/DL — SIGNIFICANT CHANGE UP (ref 8.5–10.1)
CHLORIDE SERPL-SCNC: 99 MMOL/L — SIGNIFICANT CHANGE UP (ref 98–110)
CO2 SERPL-SCNC: 28 MMOL/L — SIGNIFICANT CHANGE UP (ref 17–32)
CREAT SERPL-MCNC: 0.7 MG/DL — SIGNIFICANT CHANGE UP (ref 0.7–1.5)
EOSINOPHIL # BLD AUTO: 0.39 K/UL — SIGNIFICANT CHANGE UP (ref 0–0.7)
EOSINOPHIL NFR BLD AUTO: 4.9 % — SIGNIFICANT CHANGE UP (ref 0–8)
GLUCOSE SERPL-MCNC: 78 MG/DL — SIGNIFICANT CHANGE UP (ref 70–99)
HCT VFR BLD CALC: 43.6 % — SIGNIFICANT CHANGE UP (ref 37–47)
HGB BLD-MCNC: 14.2 G/DL — SIGNIFICANT CHANGE UP (ref 12–16)
IMM GRANULOCYTES NFR BLD AUTO: 0.1 % — SIGNIFICANT CHANGE UP (ref 0.1–0.3)
INR BLD: 1.02 RATIO — SIGNIFICANT CHANGE UP (ref 0.65–1.3)
LYMPHOCYTES # BLD AUTO: 2.45 K/UL — SIGNIFICANT CHANGE UP (ref 1.2–3.4)
LYMPHOCYTES # BLD AUTO: 31 % — SIGNIFICANT CHANGE UP (ref 20.5–51.1)
MCHC RBC-ENTMCNC: 29.8 PG — SIGNIFICANT CHANGE UP (ref 27–31)
MCHC RBC-ENTMCNC: 32.6 G/DL — SIGNIFICANT CHANGE UP (ref 32–37)
MCV RBC AUTO: 91.6 FL — SIGNIFICANT CHANGE UP (ref 81–99)
MONOCYTES # BLD AUTO: 0.59 K/UL — SIGNIFICANT CHANGE UP (ref 0.1–0.6)
MONOCYTES NFR BLD AUTO: 7.5 % — SIGNIFICANT CHANGE UP (ref 1.7–9.3)
NEUTROPHILS # BLD AUTO: 4.41 K/UL — SIGNIFICANT CHANGE UP (ref 1.4–6.5)
NEUTROPHILS NFR BLD AUTO: 55.9 % — SIGNIFICANT CHANGE UP (ref 42.2–75.2)
NRBC # BLD: 0 /100 WBCS — SIGNIFICANT CHANGE UP (ref 0–0)
PLATELET # BLD AUTO: 299 K/UL — SIGNIFICANT CHANGE UP (ref 130–400)
POTASSIUM SERPL-MCNC: 3.6 MMOL/L — SIGNIFICANT CHANGE UP (ref 3.5–5)
POTASSIUM SERPL-SCNC: 3.6 MMOL/L — SIGNIFICANT CHANGE UP (ref 3.5–5)
PROT SERPL-MCNC: 7.6 G/DL — SIGNIFICANT CHANGE UP (ref 6–8)
PROTHROM AB SERPL-ACNC: 11.7 SEC — SIGNIFICANT CHANGE UP (ref 9.95–12.87)
RBC # BLD: 4.76 M/UL — SIGNIFICANT CHANGE UP (ref 4.2–5.4)
RBC # FLD: 12.2 % — SIGNIFICANT CHANGE UP (ref 11.5–14.5)
SODIUM SERPL-SCNC: 135 MMOL/L — SIGNIFICANT CHANGE UP (ref 135–146)
WBC # BLD: 7.9 K/UL — SIGNIFICANT CHANGE UP (ref 4.8–10.8)
WBC # FLD AUTO: 7.9 K/UL — SIGNIFICANT CHANGE UP (ref 4.8–10.8)

## 2020-09-29 RX ORDER — PANTOPRAZOLE SODIUM 20 MG/1
1 TABLET, DELAYED RELEASE ORAL
Qty: 0 | Refills: 0 | DISCHARGE

## 2020-09-29 RX ORDER — ECHINACEA PURPUREA EXTRACT 125 MG
0 TABLET ORAL
Qty: 0 | Refills: 0 | DISCHARGE

## 2020-09-29 NOTE — ASSESSMENT
[FreeTextEntry1] : 30F with recurrent pilonidal abscess\par \par I discussed the pathology of pilonidal cyst with the patient.  I recommended examination under anesthesia with pilonidal cyst excision and cleft lift procedure.  All risks benefits and alternatives were discussed including bleeding, infection, damage to surrounding structures, wound breakdown and recurrence.  The patient expressed understanding. She was scheduled for 10/6/20.\par

## 2020-09-29 NOTE — HISTORY OF PRESENT ILLNESS
[FreeTextEntry1] : Patient is a 30F with no significant PMH who presents for follow up of recurrent pilonidal abscess.  She was previously cared for by Dr. Amaya. Originally she was though to have a sebaceous cyst and was taken for excision on 10/31/2019.  Pathology showed marked inflammatory reaction associated with sinus tract favoring pilonidal cyst.  She has since had 2 recurrence.  The most recent was treated on her last visit with Augmentin given that it had spontaneously drained.  She presents today to schedule surgery.  Since her last visit it has spontaneously drained again. She is tolerating a diet and denies fevers, chills, nausea, vomiting, abdominal pain, constipation, diarrhea, blood in his stool or unexpected weight loss.  Patient denies a family history of colon cancer rectal cancer or inflammatory bowel disease.  Patient has never had a colonoscopy.\par

## 2020-09-29 NOTE — H&P PST ADULT - NSICDXPASTMEDICALHX_GEN_ALL_CORE_FT
PAST MEDICAL HISTORY:  Asthma 2001 as a child    GERD (gastroesophageal reflux disease)     History of allergic rhinitis shakira

## 2020-09-29 NOTE — H&P PST ADULT - HISTORY OF PRESENT ILLNESS
Pt states she has been suffering with pilonidal cyst for 3 years that was previously surgical excised and drained but has now returned and causing discomfort. Denies any chest pain, difficulty breathing, SOB, palpitations, dysuria, URI, or any other infections in the last 2 weeks. Denies any recent travel, contact, or exposure to any persons with known or suspected COVID-19. Pt also denies COVID testing within the last 2 weeks. Denies any suicidal or homicidal ideations. Pt advised to self quarantine until day of procedure. Exercise tolerance of 2-3 flights of stairs without dyspnea. MENDOZA reviewed with patient. Pt verbalized understanding of all pre-operative instructions.    Anesthesia Alert  NO--Difficult Airway  NO--History of neck surgery or radiation  NO--Limited ROM of neck  NO--History of Malignant hyperthermia  NO--No personal or family history of Pseudocholinesterase deficiency.  YES--Prior Anesthesia Complication: PONV  NO--Latex Allergy  NO--Loose teeth  NO--History of Rheumatoid Arthritis  NO--MENDOZA  NO--Other_____

## 2020-09-29 NOTE — PHYSICAL EXAM
[Abdomen Masses] : No abdominal masses [Abdomen Tenderness] : ~T No ~M abdominal tenderness [No HSM] : no hepatosplenomegaly [None] : no anal fissures seen [Excoriation] : no perianal excoriation [Fistula] : no fistulas [Wart] : no warts [Ulcer ___ cm] : no ulcers [Pilonidal Cyst] : a pilonidal cyst [Tender, Swollen] : nontender, non-swollen [Thrombosed] : that was not thrombosed [Skin Tags] : there were no residual hemorrhoidal skin tags seen [Respiratory Effort] : normal respiratory effort [Normal Rate and Rhythm] : normal rate and rhythm [de-identified] : external examination shows a 4cm keloid scar on the right at the top of the  cleft.  The previous defect and erythema have resolved.  No erythema induration or purulence noticed.  There is a small pit in the posterior midline immediately caudal to the previous incision. [de-identified] : awake, alert and in no acute distress

## 2020-10-03 ENCOUNTER — OUTPATIENT (OUTPATIENT)
Dept: OUTPATIENT SERVICES | Facility: HOSPITAL | Age: 30
LOS: 1 days | Discharge: HOME | End: 2020-10-03

## 2020-10-03 ENCOUNTER — LABORATORY RESULT (OUTPATIENT)
Age: 30
End: 2020-10-03

## 2020-10-03 DIAGNOSIS — Z98.890 OTHER SPECIFIED POSTPROCEDURAL STATES: Chronic | ICD-10-CM

## 2020-10-03 DIAGNOSIS — Z11.59 ENCOUNTER FOR SCREENING FOR OTHER VIRAL DISEASES: ICD-10-CM

## 2020-10-06 ENCOUNTER — RESULT REVIEW (OUTPATIENT)
Age: 30
End: 2020-10-06

## 2020-10-06 ENCOUNTER — APPOINTMENT (OUTPATIENT)
Dept: SURGERY | Facility: HOSPITAL | Age: 30
End: 2020-10-06

## 2020-10-06 ENCOUNTER — OUTPATIENT (OUTPATIENT)
Dept: OUTPATIENT SERVICES | Facility: HOSPITAL | Age: 30
LOS: 1 days | Discharge: HOME | End: 2020-10-06
Payer: COMMERCIAL

## 2020-10-06 VITALS
SYSTOLIC BLOOD PRESSURE: 113 MMHG | DIASTOLIC BLOOD PRESSURE: 63 MMHG | WEIGHT: 117.95 LBS | HEART RATE: 89 BPM | OXYGEN SATURATION: 100 % | RESPIRATION RATE: 20 BRPM | HEIGHT: 61 IN | TEMPERATURE: 99 F

## 2020-10-06 VITALS
HEART RATE: 90 BPM | DIASTOLIC BLOOD PRESSURE: 76 MMHG | OXYGEN SATURATION: 98 % | RESPIRATION RATE: 13 BRPM | SYSTOLIC BLOOD PRESSURE: 134 MMHG

## 2020-10-06 DIAGNOSIS — Z98.890 OTHER SPECIFIED POSTPROCEDURAL STATES: Chronic | ICD-10-CM

## 2020-10-06 PROCEDURE — 11772 EXC PILONIDAL CYST COMP: CPT

## 2020-10-06 PROCEDURE — 88304 TISSUE EXAM BY PATHOLOGIST: CPT | Mod: 26

## 2020-10-06 RX ORDER — SODIUM CHLORIDE 9 MG/ML
1000 INJECTION, SOLUTION INTRAVENOUS
Refills: 0 | Status: DISCONTINUED | OUTPATIENT
Start: 2020-10-06 | End: 2020-10-06

## 2020-10-06 RX ORDER — HYDROMORPHONE HYDROCHLORIDE 2 MG/ML
1 INJECTION INTRAMUSCULAR; INTRAVENOUS; SUBCUTANEOUS
Refills: 0 | Status: DISCONTINUED | OUTPATIENT
Start: 2020-10-06 | End: 2020-10-06

## 2020-10-06 RX ORDER — OXYCODONE AND ACETAMINOPHEN 5; 325 MG/1; MG/1
2 TABLET ORAL ONCE
Refills: 0 | Status: DISCONTINUED | OUTPATIENT
Start: 2020-10-06 | End: 2020-10-06

## 2020-10-06 RX ORDER — KETOROLAC TROMETHAMINE 30 MG/ML
1 SYRINGE (ML) INJECTION
Qty: 28 | Refills: 0
Start: 2020-10-06 | End: 2020-10-12

## 2020-10-06 RX ORDER — MEPERIDINE HYDROCHLORIDE 50 MG/ML
12.5 INJECTION INTRAMUSCULAR; INTRAVENOUS; SUBCUTANEOUS
Refills: 0 | Status: DISCONTINUED | OUTPATIENT
Start: 2020-10-06 | End: 2020-10-06

## 2020-10-06 RX ORDER — OXYCODONE AND ACETAMINOPHEN 5; 325 MG/1; MG/1
1 TABLET ORAL ONCE
Refills: 0 | Status: DISCONTINUED | OUTPATIENT
Start: 2020-10-06 | End: 2020-10-06

## 2020-10-06 RX ORDER — ONDANSETRON 8 MG/1
4 TABLET, FILM COATED ORAL ONCE
Refills: 0 | Status: COMPLETED | OUTPATIENT
Start: 2020-10-06 | End: 2020-10-06

## 2020-10-06 RX ORDER — HYDROMORPHONE HYDROCHLORIDE 2 MG/ML
0.5 INJECTION INTRAMUSCULAR; INTRAVENOUS; SUBCUTANEOUS
Refills: 0 | Status: DISCONTINUED | OUTPATIENT
Start: 2020-10-06 | End: 2020-10-06

## 2020-10-06 RX ADMIN — ONDANSETRON 4 MILLIGRAM(S): 8 TABLET, FILM COATED ORAL at 18:00

## 2020-10-06 NOTE — ASU DISCHARGE PLAN (ADULT/PEDIATRIC) - CALL YOUR DOCTOR IF YOU HAVE ANY OF THE FOLLOWING:
Fever greater than (need to indicate Fahrenheit or Celsius)/Numbness, tingling, color or temperature change to extremity/Excessive diarrhea/Wound/Surgical Site with redness, or foul smelling discharge or pus/Pain not relieved by Medications/Unable to urinate/Bleeding that does not stop/Inability to tolerate liquids or foods/Swelling that gets worse

## 2020-10-06 NOTE — ASU DISCHARGE PLAN (ADULT/PEDIATRIC) - MEDICATION INSTRUCTIONS
Please take toradol 10mg every 6 hours for pain. You may also take tylenol 650 mg every 6 hours and ibuprofen  600mg every 8 hours for pain.

## 2020-10-06 NOTE — BRIEF OPERATIVE NOTE - NSICDXBRIEFPROCEDURE_GEN_ALL_CORE_FT
PROCEDURES:  Excision, pilonidal cyst, complex 06-Oct-2020 17:15:32 Excision of pilonidal cyst with cleft lift procedure Flip Baeza

## 2020-10-06 NOTE — ASU DISCHARGE PLAN (ADULT/PEDIATRIC) - ASU DC SPECIAL INSTRUCTIONSFT
s/p pilonidal cyst excision with cleft lift procedure with drain placement    diet: resume previous diet  medication: restart home medication. Please take toradol 10mg every 6 hours and over the counter tylenol 650 mg every 6 hours for pain.  Activity: no activity that may compromise the drain attachment. No exercise until told otherwise by Dr. Peters  drain: please empty drain and monitor output (in milliliters) as needed. Keep bulb drain to suction by squeezing prior to closing lid.  dressing: keep dressing on for 24 hours. Do not shower until tomorrow. Let soap and water wash over wound. You may put dressing over wound as needed.    follow up: Please follow up with Dr. Peters in office on 10/19/2020. Please call to confirm appointment. [phone] extension [phone]

## 2020-10-06 NOTE — ASU DISCHARGE PLAN (ADULT/PEDIATRIC) - CARE PROVIDER_API CALL
Erich Peters)  ColonRectal Surgery; Surgery  [address], [address] [place] [address]  Phone: [phone]  Fax: [phone]  Follow Up Time:

## 2020-10-09 DIAGNOSIS — L05.91 PILONIDAL CYST WITHOUT ABSCESS: ICD-10-CM

## 2020-10-09 DIAGNOSIS — K21.9 GASTRO-ESOPHAGEAL REFLUX DISEASE WITHOUT ESOPHAGITIS: ICD-10-CM

## 2020-10-09 DIAGNOSIS — J45.909 UNSPECIFIED ASTHMA, UNCOMPLICATED: ICD-10-CM

## 2020-10-09 LAB — SURGICAL PATHOLOGY STUDY: SIGNIFICANT CHANGE UP

## 2020-10-16 ENCOUNTER — APPOINTMENT (OUTPATIENT)
Dept: SURGERY | Facility: CLINIC | Age: 30
End: 2020-10-16
Payer: COMMERCIAL

## 2020-10-16 VITALS
BODY MASS INDEX: 23.03 KG/M2 | WEIGHT: 122 LBS | DIASTOLIC BLOOD PRESSURE: 83 MMHG | HEART RATE: 94 BPM | TEMPERATURE: 98 F | SYSTOLIC BLOOD PRESSURE: 115 MMHG | HEIGHT: 61 IN

## 2020-10-16 PROCEDURE — 99213 OFFICE O/P EST LOW 20 MIN: CPT

## 2020-10-16 NOTE — PHYSICAL EXAM
[Abdomen Tenderness] : ~T No ~M abdominal tenderness [Abdomen Masses] : No abdominal masses [None] : no anal fissures seen [No HSM] : no hepatosplenomegaly [Excoriation] : no perianal excoriation [Fistula] : no fistulas [Wart] : no warts [Ulcer ___ cm] : no ulcers [Pilonidal Cyst] : a pilonidal cyst [Tender, Swollen] : nontender, non-swollen [Thrombosed] : that was not thrombosed [Skin Tags] : there were no residual hemorrhoidal skin tags seen [Normal Rate and Rhythm] : normal rate and rhythm [Respiratory Effort] : normal respiratory effort [de-identified] : awake, alert and in no acute distress [de-identified] : There is a large scar - which is healing well. There is a 2 mm breakdown but no cellulitis, fluctuation , rosanna drain is dry.

## 2020-10-16 NOTE — HISTORY OF PRESENT ILLNESS
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  She was previously cared for by Dr. Amaya. Originally she was though to have a sebaceous cyst and was taken for excision on 10/31/2019.  Pathology showed marked inflammatory reaction associated with sinus tract favoring pilonidal cyst.  She has since had 2 recurrence.  The most recent was treated on her last visit with Augmentin given that it had spontaneously drained.  She presents today to schedule surgery.  Since her last visit it has spontaneously drained again. She is tolerating a diet and denies fevers, chills, nausea, vomiting, abdominal pain, constipation, diarrhea, blood in his stool or unexpected weight loss.  Patient denies a family history of colon cancer rectal cancer or inflammatory bowel disease.  Patient has never had a colonoscopy.\par She recently had surgery by

## 2020-10-16 NOTE — ASSESSMENT
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  She was previously cared for by Dr. Amaya. Originally she was though to have a sebaceous cyst and was taken for excision on 10/31/2019.  Pathology showed marked inflammatory reaction associated with sinus tract favoring pilonidal cyst.  She has since had 2 recurrence.  The most recent was treated on her last visit with Augmentin given that it had spontaneously drained.  She presents today to schedule surgery.  Since her last visit it has spontaneously drained again. She is tolerating a diet and denies fevers, chills, nausea, vomiting, abdominal pain, constipation, diarrhea, blood in his stool or unexpected weight loss.  Patient denies a family history of colon cancer rectal cancer or inflammatory bowel disease.  Patient has never had a colonoscopy.\par She recently had surgery by \par \par exam: There is a large scar - which is healing well. There is a 2 mm breakdown but no cellulitis, fluctuation , rosanna drain is dry. \par \par she is doing well. \par there was some stool on the incision. \par All were wiped off. \par sterri stips applied\par \par We explained in great detail the pathophysiology of the disease process. We richard diagrams and discussed the workup for diagnosis and management.\par The various options were explained to the patient. The Risk , benefit and alternatives were discussed. We discussed recovery and possible complications.\par \par We discussed the importance of close follow up. \par We informed that she needs to follow up with Dr. Peters on next week. \par We also informed that she can call us if anything changes or has any questions.\par

## 2020-10-19 ENCOUNTER — APPOINTMENT (OUTPATIENT)
Dept: SURGERY | Facility: CLINIC | Age: 30
End: 2020-10-19
Payer: COMMERCIAL

## 2020-10-19 VITALS
WEIGHT: 123 LBS | SYSTOLIC BLOOD PRESSURE: 126 MMHG | HEIGHT: 61 IN | HEART RATE: 112 BPM | TEMPERATURE: 97.7 F | DIASTOLIC BLOOD PRESSURE: 78 MMHG | BODY MASS INDEX: 23.22 KG/M2

## 2020-10-19 PROCEDURE — 99024 POSTOP FOLLOW-UP VISIT: CPT

## 2020-10-20 NOTE — PHYSICAL EXAM
[Abdomen Masses] : No abdominal masses [No HSM] : no hepatosplenomegaly [Abdomen Tenderness] : ~T No ~M abdominal tenderness [Excoriation] : no perianal excoriation [None] : no anal fissures seen [Fistula] : no fistulas [Wart] : no warts [Ulcer ___ cm] : no ulcers [Pilonidal Cyst] : no pilonidal cysts [Tender, Swollen] : nontender, non-swollen [Thrombosed] : that was not thrombosed [Skin Tags] : there were no residual hemorrhoidal skin tags seen [Respiratory Effort] : normal respiratory effort [Normal Rate and Rhythm] : normal rate and rhythm [de-identified] : external examination shows a well healing incision with a 2cm area of wound dehiscence at the lower pole. There is no drainage at this time.  No erythema induration or purulence.  The drain is occluded and removed.  10cc of serosanguinous fluid was drained.   [de-identified] : awake, alert and in no acute distress

## 2020-10-20 NOTE — HISTORY OF PRESENT ILLNESS
[FreeTextEntry1] : Patient is a 30F with no significant PMH who presents for follow up of recurrent pilonidal abscess.  She was previously cared for by Dr. Amaya. Originally she was though to have a sebaceous cyst and was taken for excision on 10/31/2019.  Pathology showed marked inflammatory reaction associated with sinus tract favoring pilonidal cyst.  She has since had 2 recurrence.  The most recent was treated on her last visit with Augmentin given that it had spontaneously drained.  On 10/6/2020 she underwent excision of pilonidal cyst and cleft lift. Pathology showed pilonidal cyst.  She followed up on 10/16 with Dr. Hess due to drainage.  She had steristrips placed.  She presents today for follow up.  Patient states she is doing well overall.  She has some drainage around her YARON drain.   She is tolerating a diet and denies fevers, chills, nausea, vomiting, abdominal pain, constipation, diarrhea, blood in his stool or unexpected weight loss.  Patient denies a family history of colon cancer rectal cancer or inflammatory bowel disease.  Patient has never had a colonoscopy.\par

## 2020-10-20 NOTE — ASSESSMENT
[FreeTextEntry1] : 30F with recurrent pilonidal abscess s/p excision of pilonidal cyst and cleft lift.\par \par The patient is recovering well.  I recommended that she continue to keep the area clean and dry.  We will see her back in 2 weeks.

## 2020-11-02 ENCOUNTER — APPOINTMENT (OUTPATIENT)
Dept: SURGERY | Facility: CLINIC | Age: 30
End: 2020-11-02
Payer: COMMERCIAL

## 2020-11-02 VITALS
SYSTOLIC BLOOD PRESSURE: 115 MMHG | DIASTOLIC BLOOD PRESSURE: 80 MMHG | HEIGHT: 61 IN | TEMPERATURE: 97.7 F | WEIGHT: 119 LBS | BODY MASS INDEX: 22.47 KG/M2 | HEART RATE: 97 BPM

## 2020-11-02 PROCEDURE — 99024 POSTOP FOLLOW-UP VISIT: CPT

## 2020-11-02 NOTE — PHYSICAL EXAM
[Abdomen Masses] : No abdominal masses [Abdomen Tenderness] : ~T No ~M abdominal tenderness [No HSM] : no hepatosplenomegaly [None] : no anal fissures seen [Excoriation] : no perianal excoriation [Fistula] : no fistulas [Wart] : no warts [Ulcer ___ cm] : no ulcers [Pilonidal Cyst] : no pilonidal cysts [Tender, Swollen] : nontender, non-swollen [Thrombosed] : that was not thrombosed [Skin Tags] : there were no residual hemorrhoidal skin tags seen [Respiratory Effort] : normal respiratory effort [Normal Rate and Rhythm] : normal rate and rhythm [de-identified] : external examination shows a well healing incision.  The vast majority of the wound is well healed with a small area at the lower pole with a small scab present. There is no drainage at this time.  No erythema induration or purulence.   [de-identified] : awake, alert and in no acute distress

## 2020-11-02 NOTE — ASSESSMENT
[FreeTextEntry1] : 30F with recurrent pilonidal abscess s/p excision of pilonidal cyst and cleft lift.\par \par The patient is recovering well.  I recommended that she continue to keep the area clean and dry.  We will see her back in 1 month.

## 2020-11-02 NOTE — HISTORY OF PRESENT ILLNESS
[FreeTextEntry1] : Patient is a 30F with no significant PMH who presents for follow up of recurrent pilonidal abscess.  She was previously cared for by Dr. Amaya. Originally she was though to have a sebaceous cyst and was taken for excision on 10/31/2019.  Pathology showed marked inflammatory reaction associated with sinus tract favoring pilonidal cyst.  She has since had 2 recurrence.  The most recent was treated on her last visit with Augmentin given that it had spontaneously drained.  On 10/6/2020 she underwent excision of pilonidal cyst and cleft lift. Pathology showed pilonidal cyst.  She had partial dehiscence of the distal portion of the wound.  She presents now for follow up.  Patient states she has had minimal drainage recently.   She is tolerating a diet and denies fevers, chills, nausea, vomiting, abdominal pain, constipation, diarrhea, blood in his stool or unexpected weight loss.  Patient denies a family history of colon cancer rectal cancer or inflammatory bowel disease.  Patient has never had a colonoscopy.\par

## 2020-11-30 ENCOUNTER — APPOINTMENT (OUTPATIENT)
Dept: SURGERY | Facility: CLINIC | Age: 30
End: 2020-11-30
Payer: COMMERCIAL

## 2020-11-30 VITALS
HEIGHT: 61 IN | SYSTOLIC BLOOD PRESSURE: 118 MMHG | WEIGHT: 122 LBS | HEART RATE: 92 BPM | TEMPERATURE: 97.7 F | BODY MASS INDEX: 23.03 KG/M2 | DIASTOLIC BLOOD PRESSURE: 70 MMHG

## 2020-11-30 DIAGNOSIS — L05.91 PILONIDAL CYST W/OUT ABSCESS: ICD-10-CM

## 2020-11-30 PROCEDURE — 99024 POSTOP FOLLOW-UP VISIT: CPT

## 2020-11-30 NOTE — ASSESSMENT
[FreeTextEntry1] : 30F with recurrent pilonidal abscess s/p excision of pilonidal cyst and cleft lift.\par \par The patient has recovered well.  We will see her back as needed.

## 2020-11-30 NOTE — HISTORY OF PRESENT ILLNESS
[FreeTextEntry1] : Patient is a 30F with no significant PMH who presents for follow up of recurrent pilonidal abscess.  She was previously cared for by Dr. Amaya. Originally she was though to have a sebaceous cyst and was taken for excision on 10/31/2019.  Pathology showed marked inflammatory reaction associated with sinus tract favoring pilonidal cyst.  She has since had 2 recurrence.  The most recent was treated on her last visit with Augmentin given that it had spontaneously drained.  On 10/6/2020 she underwent excision of pilonidal cyst and cleft lift. Pathology showed pilonidal cyst.  She had partial dehiscence of the distal portion of the wound.  She presents now for follow up.  Patient states that she feels the area is completely healed.  She notices some pressure at times but otherwise is doing well. She is tolerating a diet and denies fevers, chills, nausea, vomiting, abdominal pain, constipation, diarrhea, blood in his stool or unexpected weight loss.  Patient denies a family history of colon cancer rectal cancer or inflammatory bowel disease.  Patient has never had a colonoscopy.\par

## 2020-11-30 NOTE — PHYSICAL EXAM
[Abdomen Masses] : No abdominal masses [Abdomen Tenderness] : ~T No ~M abdominal tenderness [No HSM] : no hepatosplenomegaly [None] : no anal fissures seen [Excoriation] : no perianal excoriation [Fistula] : no fistulas [Wart] : no warts [Ulcer ___ cm] : no ulcers [Pilonidal Cyst] : no pilonidal cysts [Tender, Swollen] : nontender, non-swollen [Thrombosed] : that was not thrombosed [Skin Tags] : there were no residual hemorrhoidal skin tags seen [Respiratory Effort] : normal respiratory effort [Normal Rate and Rhythm] : normal rate and rhythm [de-identified] : external examination shows a well heal incision. No erythema induration or purulence [de-identified] : awake, alert and in no acute distress

## 2021-01-28 NOTE — ASU PREOP CHECKLIST - AICD PRESENT

## 2021-10-01 ENCOUNTER — OUTPATIENT (OUTPATIENT)
Dept: OUTPATIENT SERVICES | Facility: HOSPITAL | Age: 31
LOS: 1 days | Discharge: HOME | End: 2021-10-01

## 2021-10-01 DIAGNOSIS — Z11.59 ENCOUNTER FOR SCREENING FOR OTHER VIRAL DISEASES: ICD-10-CM

## 2021-10-01 DIAGNOSIS — Z98.890 OTHER SPECIFIED POSTPROCEDURAL STATES: Chronic | ICD-10-CM

## 2021-10-04 ENCOUNTER — RESULT REVIEW (OUTPATIENT)
Age: 31
End: 2021-10-04

## 2021-10-04 ENCOUNTER — OUTPATIENT (OUTPATIENT)
Dept: OUTPATIENT SERVICES | Facility: HOSPITAL | Age: 31
LOS: 1 days | Discharge: HOME | End: 2021-10-04
Payer: COMMERCIAL

## 2021-10-04 ENCOUNTER — TRANSCRIPTION ENCOUNTER (OUTPATIENT)
Age: 31
End: 2021-10-04

## 2021-10-04 VITALS
HEIGHT: 61 IN | RESPIRATION RATE: 18 BRPM | WEIGHT: 115.96 LBS | SYSTOLIC BLOOD PRESSURE: 116 MMHG | DIASTOLIC BLOOD PRESSURE: 68 MMHG | HEART RATE: 76 BPM | TEMPERATURE: 99 F

## 2021-10-04 VITALS
RESPIRATION RATE: 18 BRPM | HEART RATE: 74 BPM | DIASTOLIC BLOOD PRESSURE: 74 MMHG | SYSTOLIC BLOOD PRESSURE: 131 MMHG | OXYGEN SATURATION: 100 %

## 2021-10-04 DIAGNOSIS — Z98.890 OTHER SPECIFIED POSTPROCEDURAL STATES: Chronic | ICD-10-CM

## 2021-10-04 PROCEDURE — 88305 TISSUE EXAM BY PATHOLOGIST: CPT | Mod: 26

## 2021-10-04 RX ORDER — NORETHINDRONE AND ETHINYL ESTRADIOL 0.4-0.035
1 KIT ORAL
Qty: 0 | Refills: 0 | DISCHARGE

## 2021-10-04 RX ORDER — PANTOPRAZOLE SODIUM 20 MG/1
1 TABLET, DELAYED RELEASE ORAL
Qty: 0 | Refills: 0 | DISCHARGE

## 2021-10-04 RX ORDER — LORATADINE 10 MG/1
1 TABLET ORAL
Qty: 0 | Refills: 0 | DISCHARGE

## 2021-10-04 RX ORDER — MONTELUKAST 4 MG/1
1 TABLET, CHEWABLE ORAL
Qty: 0 | Refills: 0 | DISCHARGE

## 2021-10-04 NOTE — CHART NOTE - NSCHARTNOTEFT_GEN_A_CORE
PACU ANESTHESIA ADMISSION NOTE      Procedure:   Post op diagnosis:      ____  Intubated  TV:______       Rate: ______      FiO2: ______    _x___  Patent Airway    __x__  Full return of protective reflexes    _x___  Full recovery from anesthesia / back to baseline     Vitals:   T:  98         R:  15                BP:  111/78                Sat:  100                 P: 90      Mental Status:  ___x_ Awake   __x___ Alert   _____ Drowsy   _____ Sedated    Nausea/Vomiting:  __x__ NO  ______Yes,   See Post - Op Orders          Pain Scale (0-10):  _____    Treatment: __x__ None    ____ See Post - Op/PCA Orders    Post - Operative Fluids:   ____ Oral   _x___ See Post - Op Orders    Plan: Discharge:   _x___Home       _____Floor     _____Critical Care    _____  Other:_________________    Comments:

## 2021-10-04 NOTE — ASU PATIENT PROFILE, ADULT - NSICDXPASTSURGICALHX_GEN_ALL_CORE_FT
PAST SURGICAL HISTORY:  History of excision of pilonidal cyst     History of incision and drainage under  local

## 2021-10-05 LAB — SURGICAL PATHOLOGY STUDY: SIGNIFICANT CHANGE UP

## 2021-10-07 DIAGNOSIS — K62.5 HEMORRHAGE OF ANUS AND RECTUM: ICD-10-CM

## 2021-10-07 DIAGNOSIS — K51.90 ULCERATIVE COLITIS, UNSPECIFIED, WITHOUT COMPLICATIONS: ICD-10-CM

## 2021-10-07 DIAGNOSIS — K21.9 GASTRO-ESOPHAGEAL REFLUX DISEASE WITHOUT ESOPHAGITIS: ICD-10-CM

## 2021-10-07 DIAGNOSIS — K64.0 FIRST DEGREE HEMORRHOIDS: ICD-10-CM

## 2021-10-07 DIAGNOSIS — K62.89 OTHER SPECIFIED DISEASES OF ANUS AND RECTUM: ICD-10-CM

## 2021-10-07 DIAGNOSIS — J45.909 UNSPECIFIED ASTHMA, UNCOMPLICATED: ICD-10-CM

## 2023-04-25 NOTE — ASU PREOP CHECKLIST - BMI (KG/M2)
Last appointment Visit 4/14/23  Next appointment   Future Appointments   Date Time Provider Beatriz Camargo   5/1/2023 10:00 AM Sybil Severance, DO Sybil Severance [facility]      Last refill 4/14/23  DOS: 4/14/23       Patient called in requesting refill of   Percocet 7.5mg   Kendell jimenez
22.3

## 2025-04-23 NOTE — H&P PST ADULT - REASON FOR ADMISSION
31 yo female presents for PAST in preparation for examination under anesthesia excision of pilonidal cyst cleft lift procedure on 10/6/2020 under general anesthesia by Dr. Peters Myalgia Monitoring: I explained this is common when taking isotretinoin. If this worsens they will contact us. Kilograms Preamble Statement (Weight Entered In Details Tab): Reported Weight in kilograms: Pounds Preamble Statement (Weight Entered In Details Tab): Reported Weight in pounds: Cheilitis Normal Treatment: I recommended application of Vaseline or Aquaphor numerous times a day (as often as every hour) and before going to bed. Female Completion Statement: After discussing her treatment course we decided to discontinue isotretinoin therapy at this time. I explained that she would need to continue her birth control methods for at least one month after the last dosage. She should also get a pregnancy test one month after the last dose. She shouldn't donate blood for one month after the last dose. She should call with any new symptoms of depression. Hypercholesterolemia Monitoring: I explained this is common when taking isotretinoin. We will monitor closely. Ipledge Number (Optional): [id number] Lower Range (In Mg/Kg): 120 Male Completion Statement: After discussing his treatment course we decided to discontinue isotretinoin therapy at this time. He shouldn't donate blood for one month after the last dose. He should call with any new symptoms of depression. Nosebleeds Normal Treatment: I explained this is common when taking isotretinoin. I recommended saline mist in each nostril multiple times a day. If this worsens they will contact us. Weight Units: pounds Is Retinoid Dermatitis Present?: No Myalgia Treatment: I explained this is common when taking isotretinoin. If this worsens they will contact us. They may try OTC ibuprofen. Xerosis Normal Treatment: I recommended application of Cetaphil or CeraVe numerous times a day going to bed to all dry areas. Upper Range (In Mg/Kg): 150 Cheilitis Aggressive Treatment: I recommended application of Vaseline or Aquaphor numerous times a day (as often as every hour) and before going to bed. I also prescribed a topical steroid for twice daily use. Use Enhanced Counseling Feature (Automatic): Yes Headache Monitoring: I recommended monitoring the headaches for now. There is no evidence of increased intracranial pressure. They were instructed to call if the headaches are worsening. Next Month's Dosage: Continue Current Dosage Xerosis Aggressive Treatment: I recommended application of Cetaphil or CeraVe numerous times a day going to bed to all dry areas. I also prescribed a topical steroid for twice daily use. Retinoid Dermatitis Normal Treatment: I recommended more frequent application of Cetaphil or CeraVe to the areas of dermatitis. Is Cheilitis Present?: Yes - Normal Treatment Comments: This is 210 of 210. Retinoid Dermatitis Aggressive Treatment: I recommended more frequent application of Cetaphil or CeraVe to the areas of dermatitis. I also prescribed a topical steroid for twice daily use until the dermatitis resolves. Dosing Month 1 (Required For Cumulative Dosing): 40mg Daily Detail Level: Zone Dosing Month 2 (Required For Cumulative Dosing): 40mg BID What Is The Patient's Gender: Female Xerosis Normal Treatment: I recommended application of Cetaphil or CeraVe numerous times a day and before going to bed to all dry areas. Counseling Text: I reviewed the side effect in detail. Patient should get monthly blood tests, not donate blood, not drive at night if vision affected, and not share medication. Female Pregnancy Counseling Text: Female patients should also be on two forms of birth control while taking this medication and for one month after their last dose. Xerosis Aggressive Treatment: I recommended application of Cetaphil or CeraVe numerous times a day and before going to bed to all dry areas. I also prescribed a topical steroid for twice daily use. Use Therapeutic Ranged Or Therapeutic Target: Target Are Labs Available For Review?: No- Labs Deferred This Month

## 2025-06-18 NOTE — H&P PST ADULT - CONSTITUTIONAL
Notified Miriam with preadmissions of okay to proceed with colonoscopy from cardiac standpoint.    Well-developed, well nourished